# Patient Record
Sex: FEMALE | Race: WHITE | NOT HISPANIC OR LATINO | Employment: FULL TIME | URBAN - METROPOLITAN AREA
[De-identification: names, ages, dates, MRNs, and addresses within clinical notes are randomized per-mention and may not be internally consistent; named-entity substitution may affect disease eponyms.]

---
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
## 2021-05-11 ENCOUNTER — OFFICE VISIT (OUTPATIENT)
Dept: URGENT CARE | Facility: CLINIC | Age: 21
End: 2021-05-11
Payer: MEDICAID

## 2021-05-11 VITALS
RESPIRATION RATE: 18 BRPM | HEIGHT: 70 IN | SYSTOLIC BLOOD PRESSURE: 110 MMHG | WEIGHT: 181 LBS | DIASTOLIC BLOOD PRESSURE: 70 MMHG | TEMPERATURE: 98.2 F | OXYGEN SATURATION: 99 % | HEART RATE: 90 BPM | BODY MASS INDEX: 25.91 KG/M2

## 2021-05-11 DIAGNOSIS — F41.0 PANIC ATTACKS: ICD-10-CM

## 2021-05-11 DIAGNOSIS — F41.9 ANXIETY: ICD-10-CM

## 2021-05-11 PROCEDURE — 99214 OFFICE O/P EST MOD 30 MIN: CPT | Performed by: PHYSICIAN ASSISTANT

## 2021-05-11 RX ORDER — HYDROXYZINE HYDROCHLORIDE 25 MG/1
25 TABLET, FILM COATED ORAL EVERY 8 HOURS PRN
Qty: 30 TABLET | Refills: 0 | Status: SHIPPED | OUTPATIENT
Start: 2021-05-11 | End: 2021-08-02

## 2021-05-11 ASSESSMENT — ENCOUNTER SYMPTOMS
COUGH: 0
TINGLING: 0
FEVER: 0
LOSS OF CONSCIOUSNESS: 0
SHORTNESS OF BREATH: 0
ABDOMINAL PAIN: 0
DIZZINESS: 0
DIARRHEA: 0
INSOMNIA: 1
FOCAL WEAKNESS: 0
NAUSEA: 0
CHILLS: 0
BLURRED VISION: 0
NERVOUS/ANXIOUS: 1
DOUBLE VISION: 0
HEADACHES: 0
PALPITATIONS: 0
HALLUCINATIONS: 0
DEPRESSION: 0
VOMITING: 0

## 2021-05-11 ASSESSMENT — LIFESTYLE VARIABLES: SUBSTANCE_ABUSE: 0

## 2021-05-11 NOTE — PROGRESS NOTES
Subjective:      Lee Winchester is a 21 y.o. female who presents with Other (dizziness, shaking, light-headed last weekend ) and Anxiety (x4 months )            The patient is here after having a panic attack at two days ago. The patient states she has had anxiety for many years. She has never been treated. She reports having an episode of severe worry, dizziness, and shaking at work. The paramedics came and evaluated her. She states she was told her vitals  And glucose were normal. She reports having increased anxiety since. She will smoke marijuana and drink alcohol occasionally. She denies any other stimulant use. She is currently asymptomatic besides mild anxiety. No chest pain or shortness of breath. No history of syncope. She denies any suicidal ideation.    Past Medical History:   Diagnosis Date   • ADHD    • Insomnia        Past Surgical History:   Procedure Laterality Date   • TONSILLECTOMY AND ADENOIDECTOMY  2019     Family History   Problem Relation Age of Onset   • Other Father         gout   • Hypertension Father    • Other Maternal Grandmother         MS   • Hypertension Paternal Grandmother    • Diabetes Neg Hx    • Heart Disease Neg Hx    • Stroke Neg Hx        No Known Allergies    Medications, Allergies, and current problem list reviewed today in Epic    Review of Systems   Constitutional: Negative for chills, fever and malaise/fatigue.   Eyes: Negative for blurred vision and double vision.   Respiratory: Negative for cough and shortness of breath.    Cardiovascular: Negative for chest pain, palpitations and leg swelling.   Gastrointestinal: Negative for abdominal pain, diarrhea, nausea and vomiting.   Skin: Negative for itching and rash.   Neurological: Negative for dizziness, tingling, focal weakness, loss of consciousness and headaches.   Psychiatric/Behavioral: Negative for depression, hallucinations, substance abuse and suicidal ideas. The patient is nervous/anxious and has insomnia.   "    All other systems reviewed and are negative.        Objective:     /70   Pulse 90   Temp 36.8 °C (98.2 °F) (Temporal)   Resp 18   Ht 1.778 m (5' 10\")   Wt 82.1 kg (181 lb)   LMP 05/05/2021 (Exact Date)   SpO2 99%   Breastfeeding No   BMI 25.97 kg/m²      Physical Exam  Constitutional:       General: She is not in acute distress.     Appearance: She is not ill-appearing.   HENT:      Head: Normocephalic and atraumatic.   Eyes:      Conjunctiva/sclera: Conjunctivae normal.   Cardiovascular:      Rate and Rhythm: Normal rate.   Pulmonary:      Effort: Pulmonary effort is normal. No respiratory distress.   Skin:     General: Skin is warm and dry.      Findings: No rash.   Neurological:      General: No focal deficit present.      Mental Status: She is alert and oriented to person, place, and time.   Psychiatric:         Mood and Affect: Mood normal. Affect is flat.         Speech: Speech normal.         Behavior: Behavior normal.         Thought Content: Thought content normal.         Cognition and Memory: Cognition and memory normal.         Judgment: Judgment normal.                 Assessment/Plan:        1. Anxiety    2. Panic attacks    - REFERRAL TO BEHAVIORAL HEALTH    - hydrOXYzine HCl (ATARAX) 25 MG Tab; Take 1 tablet by mouth every 8 hours as needed for Anxiety.  Dispense: 30 tablet; Refill: 0  Side effects and benefits of medication reviewed.     Differential diagnoses, Supportive care, and indications for immediate follow-up discussed with patient.   Pathogenesis of diagnosis discussed including typical length and natural progression.   Instructed to return to clinic or nearest emergency department for any change in condition, further concerns, or worsening of symptoms.    My total time spent caring for the patient on the day of the encounter was 30 minutes.   This does not include time spent on separately billable procedures/tests.      ,The patient demonstrated a good understanding and " agreed with the treatment plan.    Ree Allen P.A.-C.

## 2021-08-01 ENCOUNTER — HOSPITAL ENCOUNTER (EMERGENCY)
Facility: MEDICAL CENTER | Age: 21
End: 2021-08-01
Attending: EMERGENCY MEDICINE
Payer: COMMERCIAL

## 2021-08-01 ENCOUNTER — APPOINTMENT (OUTPATIENT)
Dept: RADIOLOGY | Facility: MEDICAL CENTER | Age: 21
End: 2021-08-01
Attending: EMERGENCY MEDICINE
Payer: COMMERCIAL

## 2021-08-01 VITALS
HEIGHT: 70 IN | OXYGEN SATURATION: 94 % | BODY MASS INDEX: 25.97 KG/M2 | DIASTOLIC BLOOD PRESSURE: 58 MMHG | SYSTOLIC BLOOD PRESSURE: 108 MMHG | TEMPERATURE: 98.3 F | HEART RATE: 64 BPM | RESPIRATION RATE: 18 BRPM

## 2021-08-01 DIAGNOSIS — R10.13 EPIGASTRIC PAIN: ICD-10-CM

## 2021-08-01 DIAGNOSIS — R11.2 NON-INTRACTABLE VOMITING WITH NAUSEA, UNSPECIFIED VOMITING TYPE: ICD-10-CM

## 2021-08-01 LAB
ALBUMIN SERPL BCP-MCNC: 4.1 G/DL (ref 3.2–4.9)
ALBUMIN/GLOB SERPL: 1.3 G/DL
ALP SERPL-CCNC: 49 U/L (ref 30–99)
ALT SERPL-CCNC: 17 U/L (ref 2–50)
ANION GAP SERPL CALC-SCNC: 10 MMOL/L (ref 7–16)
APPEARANCE UR: ABNORMAL
AST SERPL-CCNC: 19 U/L (ref 12–45)
BACTERIA #/AREA URNS HPF: ABNORMAL /HPF
BASOPHILS # BLD AUTO: 0.2 % (ref 0–1.8)
BASOPHILS # BLD: 0.01 K/UL (ref 0–0.12)
BILIRUB SERPL-MCNC: 0.3 MG/DL (ref 0.1–1.5)
BILIRUB UR QL STRIP.AUTO: NEGATIVE
BUN SERPL-MCNC: 11 MG/DL (ref 8–22)
CALCIUM SERPL-MCNC: 9.1 MG/DL (ref 8.4–10.2)
CHLORIDE SERPL-SCNC: 104 MMOL/L (ref 96–112)
CO2 SERPL-SCNC: 24 MMOL/L (ref 20–33)
COLOR UR: YELLOW
CREAT SERPL-MCNC: 0.68 MG/DL (ref 0.5–1.4)
EOSINOPHIL # BLD AUTO: 0.08 K/UL (ref 0–0.51)
EOSINOPHIL NFR BLD: 1.2 % (ref 0–6.9)
EPI CELLS #/AREA URNS HPF: ABNORMAL /HPF
ERYTHROCYTE [DISTWIDTH] IN BLOOD BY AUTOMATED COUNT: 43.2 FL (ref 35.9–50)
GLOBULIN SER CALC-MCNC: 3.1 G/DL (ref 1.9–3.5)
GLUCOSE SERPL-MCNC: 83 MG/DL (ref 65–99)
GLUCOSE UR STRIP.AUTO-MCNC: NEGATIVE MG/DL
HCG SERPL QL: NEGATIVE
HCT VFR BLD AUTO: 38.9 % (ref 37–47)
HGB BLD-MCNC: 13 G/DL (ref 12–16)
HYALINE CASTS #/AREA URNS LPF: ABNORMAL /LPF
IMM GRANULOCYTES # BLD AUTO: 0.01 K/UL (ref 0–0.11)
IMM GRANULOCYTES NFR BLD AUTO: 0.2 % (ref 0–0.9)
KETONES UR STRIP.AUTO-MCNC: NEGATIVE MG/DL
LEUKOCYTE ESTERASE UR QL STRIP.AUTO: NEGATIVE
LIPASE SERPL-CCNC: 18 U/L (ref 7–58)
LYMPHOCYTES # BLD AUTO: 1.75 K/UL (ref 1–4.8)
LYMPHOCYTES NFR BLD: 26.8 % (ref 22–41)
MCH RBC QN AUTO: 31 PG (ref 27–33)
MCHC RBC AUTO-ENTMCNC: 33.4 G/DL (ref 33.6–35)
MCV RBC AUTO: 92.6 FL (ref 81.4–97.8)
MICRO URNS: ABNORMAL
MONOCYTES # BLD AUTO: 0.52 K/UL (ref 0–0.85)
MONOCYTES NFR BLD AUTO: 8 % (ref 0–13.4)
MUCOUS THREADS #/AREA URNS HPF: ABNORMAL /HPF
NEUTROPHILS # BLD AUTO: 4.16 K/UL (ref 2–7.15)
NEUTROPHILS NFR BLD: 63.6 % (ref 44–72)
NITRITE UR QL STRIP.AUTO: NEGATIVE
NRBC # BLD AUTO: 0 K/UL
NRBC BLD-RTO: 0 /100 WBC
PH UR STRIP.AUTO: 6.5 [PH] (ref 5–8)
PLATELET # BLD AUTO: 237 K/UL (ref 164–446)
PMV BLD AUTO: 8.8 FL (ref 9–12.9)
POTASSIUM SERPL-SCNC: 3.2 MMOL/L (ref 3.6–5.5)
PROT SERPL-MCNC: 7.2 G/DL (ref 6–8.2)
PROT UR QL STRIP: NEGATIVE MG/DL
RBC # BLD AUTO: 4.2 M/UL (ref 4.2–5.4)
RBC # URNS HPF: ABNORMAL /HPF
RBC UR QL AUTO: ABNORMAL
SODIUM SERPL-SCNC: 138 MMOL/L (ref 135–145)
SP GR UR STRIP.AUTO: 1.02
WBC # BLD AUTO: 6.5 K/UL (ref 4.8–10.8)
WBC #/AREA URNS HPF: ABNORMAL /HPF

## 2021-08-01 PROCEDURE — 96375 TX/PRO/DX INJ NEW DRUG ADDON: CPT

## 2021-08-01 PROCEDURE — 83690 ASSAY OF LIPASE: CPT

## 2021-08-01 PROCEDURE — 36415 COLL VENOUS BLD VENIPUNCTURE: CPT

## 2021-08-01 PROCEDURE — 85025 COMPLETE CBC W/AUTO DIFF WBC: CPT

## 2021-08-01 PROCEDURE — 99285 EMERGENCY DEPT VISIT HI MDM: CPT

## 2021-08-01 PROCEDURE — 96374 THER/PROPH/DIAG INJ IV PUSH: CPT

## 2021-08-01 PROCEDURE — 76705 ECHO EXAM OF ABDOMEN: CPT

## 2021-08-01 PROCEDURE — 94760 N-INVAS EAR/PLS OXIMETRY 1: CPT

## 2021-08-01 PROCEDURE — 80053 COMPREHEN METABOLIC PANEL: CPT

## 2021-08-01 PROCEDURE — 84703 CHORIONIC GONADOTROPIN ASSAY: CPT

## 2021-08-01 PROCEDURE — 81001 URINALYSIS AUTO W/SCOPE: CPT

## 2021-08-01 PROCEDURE — 700111 HCHG RX REV CODE 636 W/ 250 OVERRIDE (IP): Performed by: EMERGENCY MEDICINE

## 2021-08-01 RX ORDER — ONDANSETRON 4 MG/1
4 TABLET, ORALLY DISINTEGRATING ORAL EVERY 8 HOURS PRN
Qty: 20 TABLET | Refills: 0 | Status: SHIPPED | OUTPATIENT
Start: 2021-08-01 | End: 2021-08-01 | Stop reason: SDUPTHER

## 2021-08-01 RX ORDER — ONDANSETRON 2 MG/ML
4 INJECTION INTRAMUSCULAR; INTRAVENOUS ONCE
Status: COMPLETED | OUTPATIENT
Start: 2021-08-01 | End: 2021-08-01

## 2021-08-01 RX ORDER — DICYCLOMINE HCL 20 MG
20 TABLET ORAL EVERY 6 HOURS PRN
Qty: 20 TABLET | Refills: 0 | Status: SHIPPED | OUTPATIENT
Start: 2021-08-01 | End: 2021-08-01 | Stop reason: SDUPTHER

## 2021-08-01 RX ORDER — DICYCLOMINE HCL 20 MG
20 TABLET ORAL EVERY 6 HOURS PRN
Qty: 20 TABLET | Refills: 0 | Status: SHIPPED | OUTPATIENT
Start: 2021-08-01

## 2021-08-01 RX ORDER — HYDROMORPHONE HYDROCHLORIDE 1 MG/ML
0.5 INJECTION, SOLUTION INTRAMUSCULAR; INTRAVENOUS; SUBCUTANEOUS
Status: DISCONTINUED | OUTPATIENT
Start: 2021-08-01 | End: 2021-08-01 | Stop reason: HOSPADM

## 2021-08-01 RX ORDER — ONDANSETRON 4 MG/1
4 TABLET, ORALLY DISINTEGRATING ORAL EVERY 8 HOURS PRN
Qty: 20 TABLET | Refills: 0 | Status: SHIPPED | OUTPATIENT
Start: 2021-08-01 | End: 2021-08-02

## 2021-08-01 RX ADMIN — HYDROMORPHONE HYDROCHLORIDE 0.5 MG: 1 INJECTION, SOLUTION INTRAMUSCULAR; INTRAVENOUS; SUBCUTANEOUS at 19:07

## 2021-08-01 RX ADMIN — ONDANSETRON 4 MG: 2 INJECTION INTRAMUSCULAR; INTRAVENOUS at 19:08

## 2021-08-02 ENCOUNTER — HOSPITAL ENCOUNTER (EMERGENCY)
Facility: MEDICAL CENTER | Age: 21
End: 2021-08-02
Attending: EMERGENCY MEDICINE
Payer: COMMERCIAL

## 2021-08-02 ENCOUNTER — APPOINTMENT (OUTPATIENT)
Dept: RADIOLOGY | Facility: MEDICAL CENTER | Age: 21
End: 2021-08-02
Attending: EMERGENCY MEDICINE
Payer: COMMERCIAL

## 2021-08-02 VITALS
RESPIRATION RATE: 24 BRPM | OXYGEN SATURATION: 99 % | HEIGHT: 70 IN | BODY MASS INDEX: 25.85 KG/M2 | SYSTOLIC BLOOD PRESSURE: 122 MMHG | TEMPERATURE: 98 F | DIASTOLIC BLOOD PRESSURE: 54 MMHG | HEART RATE: 73 BPM | WEIGHT: 180.56 LBS

## 2021-08-02 DIAGNOSIS — R11.2 NON-INTRACTABLE VOMITING WITH NAUSEA, UNSPECIFIED VOMITING TYPE: ICD-10-CM

## 2021-08-02 DIAGNOSIS — R10.84 GENERALIZED ABDOMINAL PAIN: ICD-10-CM

## 2021-08-02 LAB
ALBUMIN SERPL BCP-MCNC: 4.2 G/DL (ref 3.2–4.9)
ALBUMIN/GLOB SERPL: 1.1 G/DL
ALP SERPL-CCNC: 55 U/L (ref 30–99)
ALT SERPL-CCNC: 18 U/L (ref 2–50)
AMPHET UR QL SCN: NEGATIVE
ANION GAP SERPL CALC-SCNC: 15 MMOL/L (ref 7–16)
APPEARANCE UR: CLEAR
AST SERPL-CCNC: 20 U/L (ref 12–45)
B-HCG SERPL-ACNC: <0.5 MIU/ML (ref 0–10)
BACTERIA #/AREA URNS HPF: ABNORMAL /HPF
BARBITURATES UR QL SCN: NEGATIVE
BASOPHILS # BLD AUTO: 0.2 % (ref 0–1.8)
BASOPHILS # BLD: 0.01 K/UL (ref 0–0.12)
BENZODIAZ UR QL SCN: NEGATIVE
BILIRUB SERPL-MCNC: 0.5 MG/DL (ref 0.1–1.5)
BILIRUB UR QL STRIP.AUTO: NEGATIVE
BUN SERPL-MCNC: 11 MG/DL (ref 8–22)
BZE UR QL SCN: NEGATIVE
CALCIUM SERPL-MCNC: 9.2 MG/DL (ref 8.4–10.2)
CANNABINOIDS UR QL SCN: POSITIVE
CHLORIDE SERPL-SCNC: 105 MMOL/L (ref 96–112)
CO2 SERPL-SCNC: 19 MMOL/L (ref 20–33)
COLOR UR: YELLOW
CREAT SERPL-MCNC: 0.76 MG/DL (ref 0.5–1.4)
EOSINOPHIL # BLD AUTO: 0 K/UL (ref 0–0.51)
EOSINOPHIL NFR BLD: 0 % (ref 0–6.9)
EPI CELLS #/AREA URNS HPF: ABNORMAL /HPF
ERYTHROCYTE [DISTWIDTH] IN BLOOD BY AUTOMATED COUNT: 42.4 FL (ref 35.9–50)
FLUAV RNA SPEC QL NAA+PROBE: NEGATIVE
FLUBV RNA SPEC QL NAA+PROBE: NEGATIVE
GLOBULIN SER CALC-MCNC: 3.7 G/DL (ref 1.9–3.5)
GLUCOSE SERPL-MCNC: 97 MG/DL (ref 65–99)
GLUCOSE UR STRIP.AUTO-MCNC: NEGATIVE MG/DL
HCT VFR BLD AUTO: 42.4 % (ref 37–47)
HGB BLD-MCNC: 14.2 G/DL (ref 12–16)
HYALINE CASTS #/AREA URNS LPF: ABNORMAL /LPF
IMM GRANULOCYTES # BLD AUTO: 0.02 K/UL (ref 0–0.11)
IMM GRANULOCYTES NFR BLD AUTO: 0.4 % (ref 0–0.9)
KETONES UR STRIP.AUTO-MCNC: 40 MG/DL
LEUKOCYTE ESTERASE UR QL STRIP.AUTO: NEGATIVE
LIPASE SERPL-CCNC: 18 U/L (ref 7–58)
LYMPHOCYTES # BLD AUTO: 0.49 K/UL (ref 1–4.8)
LYMPHOCYTES NFR BLD: 10.5 % (ref 22–41)
MCH RBC QN AUTO: 30.7 PG (ref 27–33)
MCHC RBC AUTO-ENTMCNC: 33.5 G/DL (ref 33.6–35)
MCV RBC AUTO: 91.8 FL (ref 81.4–97.8)
METHADONE UR QL SCN: NEGATIVE
MICRO URNS: ABNORMAL
MONOCYTES # BLD AUTO: 0.16 K/UL (ref 0–0.85)
MONOCYTES NFR BLD AUTO: 3.4 % (ref 0–13.4)
NEUTROPHILS # BLD AUTO: 3.97 K/UL (ref 2–7.15)
NEUTROPHILS NFR BLD: 85.5 % (ref 44–72)
NITRITE UR QL STRIP.AUTO: NEGATIVE
NRBC # BLD AUTO: 0 K/UL
NRBC BLD-RTO: 0 /100 WBC
OPIATES UR QL SCN: POSITIVE
OXYCODONE UR QL SCN: NEGATIVE
PCP UR QL SCN: NEGATIVE
PH UR STRIP.AUTO: 6.5 [PH] (ref 5–8)
PLATELET # BLD AUTO: 216 K/UL (ref 164–446)
PMV BLD AUTO: 9.2 FL (ref 9–12.9)
POTASSIUM SERPL-SCNC: 3.6 MMOL/L (ref 3.6–5.5)
PROPOXYPH UR QL SCN: NEGATIVE
PROT SERPL-MCNC: 7.9 G/DL (ref 6–8.2)
PROT UR QL STRIP: NEGATIVE MG/DL
RBC # BLD AUTO: 4.62 M/UL (ref 4.2–5.4)
RBC # URNS HPF: ABNORMAL /HPF
RBC UR QL AUTO: ABNORMAL
RSV RNA SPEC QL NAA+PROBE: NEGATIVE
SARS-COV-2 RNA RESP QL NAA+PROBE: NOTDETECTED
SODIUM SERPL-SCNC: 139 MMOL/L (ref 135–145)
SP GR UR STRIP.AUTO: <=1.005
SPECIMEN SOURCE: NORMAL
WBC # BLD AUTO: 4.7 K/UL (ref 4.8–10.8)
WBC #/AREA URNS HPF: ABNORMAL /HPF

## 2021-08-02 PROCEDURE — C9803 HOPD COVID-19 SPEC COLLECT: HCPCS | Performed by: EMERGENCY MEDICINE

## 2021-08-02 PROCEDURE — 81001 URINALYSIS AUTO W/SCOPE: CPT

## 2021-08-02 PROCEDURE — 700105 HCHG RX REV CODE 258: Performed by: EMERGENCY MEDICINE

## 2021-08-02 PROCEDURE — 80053 COMPREHEN METABOLIC PANEL: CPT

## 2021-08-02 PROCEDURE — 96375 TX/PRO/DX INJ NEW DRUG ADDON: CPT

## 2021-08-02 PROCEDURE — 80307 DRUG TEST PRSMV CHEM ANLYZR: CPT

## 2021-08-02 PROCEDURE — 0241U HCHG SARS-COV-2 COVID-19 NFCT DS RESP RNA 4 TRGT MIC: CPT

## 2021-08-02 PROCEDURE — 96374 THER/PROPH/DIAG INJ IV PUSH: CPT

## 2021-08-02 PROCEDURE — 74177 CT ABD & PELVIS W/CONTRAST: CPT

## 2021-08-02 PROCEDURE — 83690 ASSAY OF LIPASE: CPT

## 2021-08-02 PROCEDURE — 700111 HCHG RX REV CODE 636 W/ 250 OVERRIDE (IP): Performed by: EMERGENCY MEDICINE

## 2021-08-02 PROCEDURE — 700117 HCHG RX CONTRAST REV CODE 255: Performed by: EMERGENCY MEDICINE

## 2021-08-02 PROCEDURE — 99284 EMERGENCY DEPT VISIT MOD MDM: CPT

## 2021-08-02 PROCEDURE — 84702 CHORIONIC GONADOTROPIN TEST: CPT

## 2021-08-02 PROCEDURE — 85025 COMPLETE CBC W/AUTO DIFF WBC: CPT

## 2021-08-02 RX ORDER — ONDANSETRON 2 MG/ML
4 INJECTION INTRAMUSCULAR; INTRAVENOUS ONCE
Status: COMPLETED | OUTPATIENT
Start: 2021-08-02 | End: 2021-08-02

## 2021-08-02 RX ORDER — CALCIUM CARBONATE 500 MG/1
500 TABLET, CHEWABLE ORAL
COMMUNITY

## 2021-08-02 RX ORDER — PHOSPHORATED CARBOHYDRATE 1.87; 21.5; 1.87 G/5ML; MG/5ML; G/5ML
15 SOLUTION ORAL
COMMUNITY

## 2021-08-02 RX ORDER — FAMOTIDINE 20 MG/1
20 TABLET, FILM COATED ORAL
COMMUNITY

## 2021-08-02 RX ORDER — AZITHROMYCIN 500 MG/1
500 TABLET, FILM COATED ORAL DAILY
Qty: 3 TABLET | Refills: 0 | Status: SHIPPED | OUTPATIENT
Start: 2021-08-02 | End: 2021-08-05

## 2021-08-02 RX ORDER — IBUPROFEN 200 MG
400 TABLET ORAL EVERY 6 HOURS PRN
COMMUNITY

## 2021-08-02 RX ORDER — MORPHINE SULFATE 4 MG/ML
4 INJECTION, SOLUTION INTRAMUSCULAR; INTRAVENOUS ONCE
Status: COMPLETED | OUTPATIENT
Start: 2021-08-02 | End: 2021-08-02

## 2021-08-02 RX ORDER — SODIUM CHLORIDE 9 MG/ML
1000 INJECTION, SOLUTION INTRAVENOUS ONCE
Status: COMPLETED | OUTPATIENT
Start: 2021-08-02 | End: 2021-08-02

## 2021-08-02 RX ADMIN — IOHEXOL 100 ML: 350 INJECTION, SOLUTION INTRAVENOUS at 11:02

## 2021-08-02 RX ADMIN — MORPHINE SULFATE 4 MG: 4 INJECTION INTRAVENOUS at 10:39

## 2021-08-02 RX ADMIN — SODIUM CHLORIDE 1000 ML: 9 INJECTION, SOLUTION INTRAVENOUS at 10:39

## 2021-08-02 RX ADMIN — ONDANSETRON 4 MG: 2 INJECTION INTRAMUSCULAR; INTRAVENOUS at 10:38

## 2021-08-02 ASSESSMENT — FIBROSIS 4 INDEX: FIB4 SCORE: 0.41

## 2021-08-02 NOTE — ED PROVIDER NOTES
ED Provider Note    Chief Complaint:   Nausea and vomiting, abdominal pain    HPI:  Lee Winchester is a very pleasant 21-year-old woman who presents to the emergency department for further evaluation of nausea and vomiting.  She was seen in this emergency department yesterday and discharged, states that she initially felt improved, however her nausea and vomiting recurred today.  Her pain first began about 2 months ago.  At that time pain seem to be waxing and waning, with complete resolution between symptoms.  Over the past 1 to 2 weeks, the pain seems to be more consistent, and became associated with episodes of nausea and vomiting.  This is been almost constant for the past few days, prompting her to come to the emergency department yesterday.  After she returned home, she began to vomit late last night.  Vomiting continued throughout the night and into the morning, prompting return.  She does smoke marijuana every evening.  She said no associated fevers, no chest pain, no shortness of breath.  She is unable to identify any consistently exacerbating or alleviating factors.  She was prescribed antinausea medication, as well as pain medication yesterday, but did not have time to fill that prescription before her symptoms recurred due to the late evening and limited pharmacy hours on Sundays.  She has no abdominal distention, no associated constipation.    Review of Systems:  See HPI for pertinent positives and negatives. All other systems negative.    Past Medical History:   has a past medical history of ADHD and Insomnia.    Social History:  Social History     Tobacco Use   • Smoking status: Former Smoker     Packs/day: 0.25     Years: 5.00     Pack years: 1.25     Quit date: 7/16/2018     Years since quitting: 3.0   • Smokeless tobacco: Never Used   • Tobacco comment: Nicotine pouches    Vaping Use   • Vaping Use: Former   Substance and Sexual Activity   • Alcohol use: Yes     Comment: Rarely   • Drug use:  "Yes     Types: Marijuana, Inhaled   • Sexual activity: Yes     Partners: Male     Birth control/protection: Implant       Surgical History:   has a past surgical history that includes tonsillectomy and adenoidectomy (2019).    Current Medications:  Home Medications     Reviewed by Rebekah Patel R.N. (Registered Nurse) on 08/02/21 at 0903  Med List Status: Partial   Medication Last Dose Status   dicyclomine (BENTYL) 20 MG Tab  Active   hydrOXYzine HCl (ATARAX) 25 MG Tab  Active   ondansetron (ZOFRAN ODT) 4 MG TABLET DISPERSIBLE  Active   SPRINTEC 28 0.25-35 MG-MCG per tablet  Active                Allergies:  No Known Allergies    Physical Exam:  Vital Signs: /54   Pulse 73   Temp 36.7 °C (98 °F) (Temporal)   Resp (!) 24   Ht 1.778 m (5' 10\")   Wt 81.9 kg (180 lb 8.9 oz)   LMP 07/18/2021 (Approximate)   SpO2 99%   BMI 25.91 kg/m²   Constitutional: Alert, no acute distress, afebrile  HENT: Normocephalic, mask in place  Eyes: Pupils equal and reactive, normal conjunctiva  Neck: Supple, normal range of motion, no stridor  Cardiovascular: Extremities are warm and well perfused, no murmur appreciated, normal cardiac auscultation  Pulmonary: No respiratory distress, normal work of breathing, no accessory muscule usage, breath sounds clear and equal bilaterally  Abdomen: Soft, non-distended, non-tender to palpation, no peritoneal signs, no rebound, no guarding, no localizable right lower quadrant tenderness to palpation  Skin: Warm, dry, no rashes or lesions  Musculoskeletal: Normal range of motion in all extremities, no swelling or deformity noted  Neurologic: Alert, oriented, normal speech, normal motor function  Psychiatric: Normal and appropriate mood and affect    Medical records reviewed for continuity of care. Ms. Winchester was seen in this emergency department yesterday for evaluation of intermittent abdominal pain.  On laboratory evaluation White blood count 6.5, potassium 3.2, urinalysis with few " epithelial cells, few bacteria, and nitrite negative.  Right upper quadrant ultrasound was unremarkable.  Urine culture if indicated was ordered, it appears as though urine culture was not sent, or was canceled.    Labs:  Labs Reviewed   URINALYSIS - Abnormal; Notable for the following components:       Result Value    Ketones 40 (*)     Occult Blood Trace (*)     All other components within normal limits   CBC WITH DIFFERENTIAL - Abnormal; Notable for the following components:    WBC 4.7 (*)     MCHC 33.5 (*)     Neutrophils-Polys 85.50 (*)     Lymphocytes 10.50 (*)     Lymphs (Absolute) 0.49 (*)     All other components within normal limits   COMP METABOLIC PANEL - Abnormal; Notable for the following components:    Co2 19 (*)     Globulin 3.7 (*)     All other components within normal limits   URINE DRUG SCREEN - Abnormal; Notable for the following components:    Opiates Positive (*)     Cannabinoid Metab Positive (*)     All other components within normal limits   URINE MICROSCOPIC (W/UA) - Abnormal; Notable for the following components:    RBC 2-5 (*)     Bacteria Rare (*)     All other components within normal limits   LIPASE   HCG QUANTITATIVE   COV-2, FLU A/B, AND RSV BY PCR    Narrative:     Have you been in close contact with a person who is suspected  or known to be positive for COVID-19 within the last 30 days  (e.g. last seen that person < 30 days ago)->Unknown   ESTIMATED GFR       Radiology:  CT-ABDOMEN-PELVIS WITH   Final Result      1.  Diffuse edema and thickening of the wall of the colon. Differential diagnosis includes infectious colitis as well as inflammatory bowel disease.      2.  Fatty liver.      3.  Splenomegaly.           ED Medications Administered:  Medications   morphine (pf) 4 mg/mL injection 4 mg (4 mg Intravenous Given 8/2/21 1039)   ondansetron (ZOFRAN) syringe/vial injection 4 mg (4 mg Intravenous Given 8/2/21 1038)   NS infusion 1,000 mL (0 mL Intravenous Stopped 8/2/21 1200)    iohexol (OMNIPAQUE) 350 mg/mL (100 mL Intravenous Given 8/2/21 1102)       Differential diagnosis:  Electrolyte abnormality, dehydration, pancreatitis, cyclic vomiting syndrome, cannabinoid hyperemesis, intra-abdominal infection including colitis, diverticulitis    MDM:  Ms. Winchester presents to the emergency department for recurrent nausea, vomiting, and abdominal pain that began last night after discharge from the emergency department.  On arrival to the emergency department she is afebrile, denies any tachycardia, no hypotension, no evidence of Sirs or sepsis with regard to her vital signs.  She has no peritoneal signs on abdominal exam.    On laboratory evaluation, urine drug screen is positive for cannabinoids, positive for opiates.  COVID-19 testing is negative.  hCG is negative ruling out pregnancy and pregnancy related complications.  She has no significant abnormalities on CMP, no electrolyte abnormalities.  Bilirubin and liver enzymes are within normal limits, no evidence of obstructive biliary process.  Lipase is normal, without evidence of pancreatitis.  White blood count is just below normal reference range at 4.7, no bands resulted at this time, again less concerning for severe bacterial infection.    Right upper quadrant ultrasound performed yesterday was within normal limits.  CT abdomen pelvis demonstrates diffuse edema and thickening of the wall of the colon.  This may represent infectious colitis, as well as inflammatory bowel disease.  Appendix is nondilated.    She is having mild intermittent diarrhea, this may be explained by colitis.  However intractable vomiting may be due to a different cause.  She has no evidence of Sirs or sepsis, but I will treat her with a short course of antibiotics for presumed infectious diarrhea.  Given her nightly marijuana use, cannabinoid hyperemesis syndrome is a possibility as well.  She received morphine and Zofran in the emergency department with significant  improvement of symptoms.  She does have a prescription for Zofran at home.  I have referred her to gastroenterology given abnormal findings on CT, she will call today to schedule that follow-up appointment. Return precautions were discussed with the patient, and provided in written form with the patient's discharge instructions.     Personal protective equipment including N95 surgical respirator, goggles, and gloves were used during this encounter.       Disposition:  Discharge home in stable condition    Final Impression:  1. Generalized abdominal pain    2. Non-intractable vomiting with nausea, unspecified vomiting type        Electronically signed by: Shirin Feliz MD, 8/2/2021 2:14 PM

## 2021-08-02 NOTE — ED NOTES
Patient attempted to provide urine sample, transported to bathroom via wheelchair, unable to provide at this time

## 2021-08-02 NOTE — DISCHARGE INSTRUCTIONS
Please take all medication as prescribed.  You may call the number above today to schedule a follow-up appointment with a gastroenterologist.  Please let them know that you were seen in the emergency department, and diagnosed with an inflammatory or infectious colitis.    Please follow up with your primary care physician in 24 hours for abdominal recheck if your symptoms have not completely gone away. Call your primary care physician at the opening of business hours to let them know you were seen in the emergency department. Return immediately if your pain returns or worsens, if you develop any new symptoms, if you are not able to drink fluids, if you have persistent vomiting, if you develop fevers, or if you have any further concerns. Additionally, please return if your symptoms have not resolved and you are unable to follow up with your primary care physician for recheck.

## 2021-08-02 NOTE — ED TRIAGE NOTES
Pt ambulates to triage with boyfriend  Seen here last night for same symptoms  Chief Complaint   Patient presents with   • Abdominal Pain   Pt A & 0 x 4, pt sobbing and tearful, ambulates slowly, states pain worsened at 0500    Pharmacy closed and did not get to  prescriptions    Pt reports 4 stools this am with red streaks and black pieces and green. Green emesis x 1 this am    COVID-19 screening criteria completed, pt denies high risk travel and denies contact with COVID-19 positive pt    Pt updated on triage process and asked to inform RN of any changes while waiting in lobby.

## 2021-08-02 NOTE — ED TRIAGE NOTES
"Presents accompanied by family.  Pt C/O epigastric pain with associated episodic N/V/D recurring for the past 2 months intermittently. She describes a painful swelling across the sagittal abdomen.   Chief Complaint   Patient presents with   • Abdominal Pain   • Nausea/Vomiting/Diarrhea     /79   Pulse 75   Temp 36.7 °C (98 °F) (Temporal)   Resp 20   Ht 1.778 m (5' 10\")   LMP 07/18/2021 (Approximate)   SpO2 99%   BMI 25.97 kg/m²      "

## 2021-08-02 NOTE — ED NOTES
D/c pt home,1  rx given . Pt aware of f/u instructions , aware to return for any changes or concerns. No further questions upon d/c home from ed

## 2021-08-02 NOTE — ED PROVIDER NOTES
ED Provider Note    Scribed for Fox Jimenez M.D. by Aidan Lerma. 8/1/2021  6:42 PM    Primary care provider: Pcp Pt States None  Means of arrival: Walk in  History obtained from: Patient  History limited by: None    CHIEF COMPLAINT  Chief Complaint   Patient presents with    Abdominal Pain    Nausea/Vomiting/Diarrhea       HPI  Lee Winchester is a 21 y.o. female who presents to the Emergency Department for intermittent abdominal pain onset 2 months ago. She describes having episodes of cramping that last for a couple of minutes with pain resolving for 10 minutes before restarting.  Per the patient, as of the last 4-5 days, the pain has been more consistent with multiple episodes every day. The patient reports additional symptoms of diarrhea, nausea, and vomiting, and denies fever. She also denies alcohol use, and adds she smokes marijuana every night before bed. Her last menstrual period was 2 weeks ago.     REVIEW OF SYSTEMS  Pertinent positives include abdominal pain, diarrhea, nausea, vomiting. Pertinent negatives include no fever.  All other systems reviewed and negative.    PAST MEDICAL HISTORY   has a past medical history of ADHD and Insomnia.    SURGICAL HISTORY   has a past surgical history that includes tonsillectomy and adenoidectomy (2019).    SOCIAL HISTORY  Social History     Tobacco Use    Smoking status: Former Smoker     Packs/day: 0.25     Years: 5.00     Pack years: 1.25     Quit date: 7/16/2018     Years since quitting: 3.0    Smokeless tobacco: Never Used    Tobacco comment: Nicotine pouches    Vaping Use    Vaping Use: Former   Substance Use Topics    Alcohol use: Yes     Comment: Rarely    Drug use: Yes     Types: Marijuana, Inhaled      Social History     Substance and Sexual Activity   Drug Use Yes    Types: Marijuana, Inhaled       FAMILY HISTORY  Family History   Problem Relation Age of Onset    Other Father         gout    Hypertension Father     Other Maternal Grandmother       "   MS    Hypertension Paternal Grandmother     Diabetes Neg Hx     Heart Disease Neg Hx     Stroke Neg Hx        CURRENT MEDICATIONS  Current Outpatient Medications   Medication Instructions    hydrOXYzine HCl (ATARAX) 25 mg, Oral, EVERY 8 HOURS PRN    SPRINTEC 28 0.25-35 MG-MCG per tablet 1 tablet, Oral, DAILY     ALLERGIES  No Known Allergies    PHYSICAL EXAM  VITAL SIGNS: /79   Pulse 75   Temp 36.7 °C (98 °F) (Temporal)   Resp 20   Ht 1.778 m (5' 10\")   LMP 07/18/2021 (Approximate)   SpO2 99%   BMI 25.97 kg/m²     Constitutional: Well developed, Well nourished, Mild to moderate distress, Non-toxic appearance.   HENT: Normocephalic, Atraumatic, Bilateral external ears normal, Oropharynx moist, No oral exudates.   Eyes: PERRLA, EOMI, Conjunctiva normal, No discharge.   Neck: No tenderness, Supple, No stridor.   Lymphatic: No lymphadenopathy noted.   Cardiovascular: Normal heart rate, Normal rhythm.   Thorax & Lungs: Clear to auscultation bilaterally, No respiratory distress, No wheezing, No crackles.   Abdomen: Soft, Tenderness in the epigastric region and right upper quadrant, No masses, No pulsatile masses.   Skin: Warm, Dry, No erythema, No rash.   Extremities:, No edema No cyanosis.   Musculoskeletal: No tenderness to palpation or major deformities noted.  Intact distal pulses  Neurologic: Awake, alert. Moves all extremities spontaneously.  Psychiatric: Affect normal, Judgment normal, Mood normal.     LABS  Results for orders placed or performed during the hospital encounter of 08/01/21   CBC WITH DIFFERENTIAL   Result Value Ref Range    WBC 6.5 4.8 - 10.8 K/uL    RBC 4.20 4.20 - 5.40 M/uL    Hemoglobin 13.0 12.0 - 16.0 g/dL    Hematocrit 38.9 37.0 - 47.0 %    MCV 92.6 81.4 - 97.8 fL    MCH 31.0 27.0 - 33.0 pg    MCHC 33.4 (L) 33.6 - 35.0 g/dL    RDW 43.2 35.9 - 50.0 fL    Platelet Count 237 164 - 446 K/uL    MPV 8.8 (L) 9.0 - 12.9 fL    Neutrophils-Polys 63.60 44.00 - 72.00 %    Lymphocytes 26.80 " 22.00 - 41.00 %    Monocytes 8.00 0.00 - 13.40 %    Eosinophils 1.20 0.00 - 6.90 %    Basophils 0.20 0.00 - 1.80 %    Immature Granulocytes 0.20 0.00 - 0.90 %    Nucleated RBC 0.00 /100 WBC    Neutrophils (Absolute) 4.16 2.00 - 7.15 K/uL    Lymphs (Absolute) 1.75 1.00 - 4.80 K/uL    Monos (Absolute) 0.52 0.00 - 0.85 K/uL    Eos (Absolute) 0.08 0.00 - 0.51 K/uL    Baso (Absolute) 0.01 0.00 - 0.12 K/uL    Immature Granulocytes (abs) 0.01 0.00 - 0.11 K/uL    NRBC (Absolute) 0.00 K/uL   COMP METABOLIC PANEL   Result Value Ref Range    Sodium 138 135 - 145 mmol/L    Potassium 3.2 (L) 3.6 - 5.5 mmol/L    Chloride 104 96 - 112 mmol/L    Co2 24 20 - 33 mmol/L    Anion Gap 10.0 7.0 - 16.0    Glucose 83 65 - 99 mg/dL    Bun 11 8 - 22 mg/dL    Creatinine 0.68 0.50 - 1.40 mg/dL    Calcium 9.1 8.4 - 10.2 mg/dL    AST(SGOT) 19 12 - 45 U/L    ALT(SGPT) 17 2 - 50 U/L    Alkaline Phosphatase 49 30 - 99 U/L    Total Bilirubin 0.3 0.1 - 1.5 mg/dL    Albumin 4.1 3.2 - 4.9 g/dL    Total Protein 7.2 6.0 - 8.2 g/dL    Globulin 3.1 1.9 - 3.5 g/dL    A-G Ratio 1.3 g/dL   LIPASE   Result Value Ref Range    Lipase 18 7 - 58 U/L   URINALYSIS,CULTURE IF INDICATED    Specimen: Blood   Result Value Ref Range    Color Yellow     Character Hazy (A)     Specific Gravity 1.020 <1.035    Ph 6.5 5.0 - 8.0    Glucose Negative Negative mg/dL    Ketones Negative Negative mg/dL    Protein Negative Negative mg/dL    Bilirubin Negative Negative    Nitrite Negative Negative    Leukocyte Esterase Negative Negative    Occult Blood Trace (A) Negative    Micro Urine Req Microscopic    HCG QUAL SERUM   Result Value Ref Range    Beta-Hcg Qualitative Serum Negative Negative   URINE MICROSCOPIC (W/UA)   Result Value Ref Range    WBC 2-5 /hpf    RBC 2-5 (A) /hpf    Bacteria Few (A) None /hpf    Epithelial Cells Few Few /hpf    Mucous Threads Many /hpf    Hyaline Cast 0-2 /lpf   ESTIMATED GFR   Result Value Ref Range    GFR If African American >60 >60 mL/min/1.73 m 2     GFR If Non African American >60 >60 mL/min/1.73 m 2        RADIOLOGY  US-RUQ   Final Result      Unremarkable liver and biliary tree ultrasound.           The radiologist's interpretation of all radiological studies have been reviewed by me.      COURSE & MEDICAL DECISION MAKING  Pertinent Labs & Imaging studies reviewed. (See chart for details)      6:42 PM - Patient seen and examined at bedside. Patient will be treated with Zofran 4 mg, Dilaudid 0.5 mg. Ordered US-RUQ, CBC w/diff, CMP, Lipase, UA culture if indicated, and HCG qual serum to evaluate her symptoms. The differential diagnoses include but are not limited to: Pancreatitis, cyclic vomiting, cholecystectomy     Decision Making:  Patient with abdominal pains, nausea vomiting diarrhea, mild tenderness palpation epigastric area, laboratory tests are unremarkable, the patient was given Zofran with improvement of her symptoms.  Will discharge the patient home on Zofran and Bentyl.  Have the patient return in the next 1 to 2 days if not improved, return sooner with worsening symptoms.    FINAL IMPRESSION  1. Non-intractable vomiting with nausea, unspecified vomiting type    2. Epigastric pain          Aidan ARAUJO ([place]), am scribing for, and in the presence of, Fox Jimenez M.D..    Electronically signed by: Aidan Lerma ([place]), 8/1/2021    Fox ARAUJO M.D. personally performed the services described in this documentation, as scribed by Aidan Lerma in my presence, and it is both accurate and complete.    C    The note accurately reflects work and decisions made by me.  Fox Jimenez M.D.  8/1/2021  9:21 PM

## 2021-08-02 NOTE — ED NOTES
Med Rec completed per patient and S/O  Allergies reviewed  No ORAL antibiotics in last 30 days

## 2021-08-02 NOTE — ED NOTES
1845 Urine collected and to lab Assessment done  1850 ERP at bedside NSL initiated Blood to lab POC reviewed awaiting results and U/S
